# Patient Record
Sex: FEMALE | Race: WHITE | Employment: FULL TIME | URBAN - METROPOLITAN AREA
[De-identification: names, ages, dates, MRNs, and addresses within clinical notes are randomized per-mention and may not be internally consistent; named-entity substitution may affect disease eponyms.]

---

## 2017-04-25 ENCOUNTER — HOSPITAL ENCOUNTER (OUTPATIENT)
Dept: PHYSICAL THERAPY | Age: 52
Discharge: HOME | End: 2017-04-25
Payer: COMMERCIAL

## 2017-04-25 PROCEDURE — 97110 THERAPEUTIC EXERCISES: CPT

## 2017-04-25 PROCEDURE — 97162 PT EVAL MOD COMPLEX 30 MIN: CPT

## 2017-04-25 NOTE — PROGRESS NOTES
PHYSICAL THERAPY - DAILY TREATMENT NOTE    Patient Name: Negar Bess        Date: 2017  : 1965   YES Patient  Verified  Visit #:     Insurance: Payor: Hong Driscoll / Plan: [address] [place] [address] Rd PT / Product Type: Commerical /      In time: 4 Out time: 5   Total Treatment Time: 60     Medicare Time Tracking (below)   Total Timed Codes (min):  - 1:1 Treatment Time:  -     TREATMENT AREA =  L shoulder    SUBJECTIVE    Pain Level (on 0 to 10 scale):  4  / 10   Medication Changes/New allergies or changes in medical history, any new surgeries or procedures? NO    If yes, update Summary List   Subjective Functional Status/Changes:  []  No changes reported     See eval          OBJECTIVE    Modality rationale:  decrease pain/edema    min [] Estim, type:                                          []  att     []  unatt     []  w/US     []  w/ice    []  w/heat    min []  Mechanical Traction: type/lbs                                               []  pro   []  sup   []  int   []  cont    min []  Ultrasound, settings/location:      min []  Iontophoresis:  []  take home patch w/ dexamethazone    min                                []  in clinic w/ dexamethazone   10 min [x]  Ice     []  Heat     position:     min []  Other:      10 min Therapeutic Exercise:  [x]  See flow sheet   []  Other:      []  Added:     to improve (function):    []  Changed:     to improve (function):       min Therapeutic Activity:      5 min Manual Therapy:       min Gait Training:       min Patient Education:  YES  Reviewed HEP   []  Progressed/Changed HEP based on:         Other Objective/Functional Measures:    See eval     Post Treatment Pain Level (on 0 to 10) scale:   3  / 10     ASSESSMENT    []  See Progress Note/Recertification   Patient will continue to benefit from skilled therapy to address remaining functional deficits: see eval   Progress toward goals / Updated goals:    -     PLAN    [x]  Upgrade activities as tolerated YES Continue plan of care   []  Discharge due to :    []  Other:      Therapist: Sandeep Acosta PT    Date: 4/25/2017 Time: 4:59 PM

## 2017-04-25 NOTE — PROGRESS NOTES
[facility] PHYSICAL THERAPY AT [facility] [address] [facility] [address] - Phone: [phone]  Fax: [phone] / [address] [place] [address]  Patient Name: Giovany Stout : 1965   Medical   Diagnosis: Left shoulder pain [M25.512] Treatment Diagnosis: L shoulder impingement   Onset Date: Chronic     Referral Source: Kim Sampson MD Start of Care [facility]): 2017   Prior Hospitalization: See medical history Provider #: [id number]   Prior Level of Function: Chronic L shoulder pain    Comorbidities: Depression, OA,  Scoliosis    Medications: Verified on Patient Summary List   The Plan of Care and following information is based on the information from the initial evaluation.   ==============================================================================  Assessment / mcadams information:   Giovany Stout is a 54 y.o. female with a chief c/o constant chronic L shoulder pain, up to 10/10. The patient reports chronic L supraspinatus tear (diagnosed with MRI, 2 years ago). She now reports constant pain, particularly with overhead activities and horizontal abduction. She has full L shoulder elevation, but limited with ER AROM/PROM (40/60 degrees) and extension (40/58 degrees). She has + impingement sings and pain with resisted L shoulder abd and Empty can. MMT for ER/IR are normal.  SHe also c/o intermittent pain down her L arm to her hand and intermittent L hand sensation changes. Mechanical exam found a preference for C/S extension, with C/ S flexion causing L scapular pain and no provocation of symptoms with C/S extension. FOTO score = 57.   The patient would benefit from skilled physical therapy at this time.  ===========================================================================================  Eval Complexity: History MEDIUM  Complexity : 1-2 comorbidities / personal factors will impact the outcome/ POC ;  Examination  HIGH Complexity : 4+ Standardized tests and measures addressing body structure, function, activity limitation and / or participation in recreation ; Presentation MEDIUM Complexity : Evolving with changing characteristics ; Decision Making MEDIUM Complexity : FOTO score of 26-74; Overall Complexity MEDIUM  Problem List: pain affecting function, decrease ROM, decrease strength, edema affecting function, decrease ADL/ functional abilitiies, decrease activity tolerance and decrease flexibility/ joint mobility   Treatment Plan may include any combination of the following: Therapeutic exercise, Therapeutic activities, Neuromuscular re-education, Physical agent/modality, Manual therapy and Patient education  Patient / Family readiness to learn indicated by: asking questions, trying to perform skills and interest  Persons(s) to be included in education: patient (P)  Barriers to Learning/Limitations: None  Measures taken:    Patient Goal (s): \"learn treatment plan\"   Patient self reported health status: excellent  Rehabilitation Potential: good   Short Term Goals: To be accomplished in  2  weeks:  1. Pt compliant with HEP and self-care   Long Term Goals: To be accomplished in  4-5  weeks:  1. All ADL's without L shoulder pain > 2/10  2. Increase FOTO score to > 70, to indicate increased function  3. Pain free L shoulder abd and horizontal abd  4. Independent with HEP/selfcare   Frequency / Duration:   Patient to be seen 2-3  times per week for 4-8  weeks:  Patient / Caregiver education and instruction: self care, activity modification and exercises  Therapist Signature: Chante Guadarrama PT, MDT Date: 0/91/4048   Certification Period: NA Time: 5:00 PM   ===========================================================================================  I certify that the above Physical Therapy Services are being furnished while the patient is under my care.   I agree with the treatment plan and certify that this therapy is necessary. Physician Signature:        Date:       Time:     Please sign and return to [facility] [place] or you may fax the signed copy to [phone]. Thank you.

## 2017-04-27 ENCOUNTER — HOSPITAL ENCOUNTER (OUTPATIENT)
Dept: PHYSICAL THERAPY | Age: 52
Discharge: HOME | End: 2017-04-27
Payer: COMMERCIAL

## 2017-04-27 PROCEDURE — 97140 MANUAL THERAPY 1/> REGIONS: CPT

## 2017-04-27 PROCEDURE — 97110 THERAPEUTIC EXERCISES: CPT

## 2017-04-27 NOTE — PROGRESS NOTES
PHYSICAL THERAPY - DAILY TREATMENT NOTE    Patient Name: Mayelin Gastelum        Date: 2017  : 1965   YES Patient  Verified  Visit #:     Insurance: Payor: Soy Yo / Plan: [address] [place] [address] Rd PT / Product Type: Commerical /      In time: 0 Out time: 05   Total Treatment Time: 50     Medicare Time Tracking (below)   Total Timed Codes (min):  - 1:1 Treatment Time:  -     TREATMENT AREA =  L shoulder    SUBJECTIVE    Pain Level (on 5 to 10 scale): 5  / 10   Medication Changes/New allergies or changes in medical history, any new surgeries or procedures? NO    If yes, update Summary List   Subjective Functional Status/Changes:  []  No changes reported   Pt states that she normally just \"goes\" until she feels a lot of pain. She states that she does not use her L arm much and was feeling sore after her last visit. OBJECTIVE    Modality rationale:  decrease pain/edema    min [] Estim, type:                                          []  att     []  unatt     []  w/US     []  w/ice    []  w/heat    min []  Mechanical Traction: type/lbs                                               []  pro   []  sup   []  int   []  cont    min []  Ultrasound, settings/location:      min []  Iontophoresis:  []  take home patch w/ dexamethazone    min                                []  in clinic w/ dexamethazone   10 min [x]  Ice     []  Heat     position: Supine to L shoulder    min []  Other:      30 min Therapeutic Exercise:  [x]  See flow sheet   []  Other:      []  Added:     to improve (function):    []  Changed:     to improve (function):       min Therapeutic Activity:      8 min Manual Therapy: L UE mobility and scap/ACJ/SCJ mobs to reduce pain and improve functional mobility so pt can utilize L UE more. min Gait Training:       min Patient Education:  YES  Reviewed HEP   []  Progressed/Changed HEP based on:         Other Objective/Functional Measures:  Increased pain with horizontal add > end range flexion. ER/IR AROM WNL. Advised pt to use ice at end of day if L shoulder is painful/sore.      Post Treatment Pain Level (on 0 to 10) scale:   7/ 10     ASSESSMENT     []  See Progress Note/Recertification   Patient will continue to benefit from skilled therapy to address remaining functional deficits: see eval   Progress toward goals / Updated goals:    -     PLAN    [x]  Upgrade activities as tolerated YES Continue plan of care   []  Discharge due to :    []  Other:      Therapist: Garrison Batch    Date: 4/27/2017 Time: 4:59 PM

## 2017-05-02 ENCOUNTER — APPOINTMENT (OUTPATIENT)
Dept: PHYSICAL THERAPY | Age: 52
End: 2017-05-02
Payer: COMMERCIAL

## 2017-05-04 ENCOUNTER — HOSPITAL ENCOUNTER (OUTPATIENT)
Dept: PHYSICAL THERAPY | Age: 52
Discharge: HOME | End: 2017-05-04
Payer: COMMERCIAL

## 2017-05-04 PROCEDURE — 97110 THERAPEUTIC EXERCISES: CPT

## 2017-05-04 PROCEDURE — 97140 MANUAL THERAPY 1/> REGIONS: CPT

## 2017-05-04 NOTE — PROGRESS NOTES
PHYSICAL THERAPY - DAILY TREATMENT NOTE    Patient Name: Trevor Lopez        Date: 2017  : 1965   YES Patient  Verified  Visit #:   3of   12  Insurance: Payor: Miki Rogers / Plan: [address] PT / Product Type: Commerical /      In time: 505 Out time: 600   Total Treatment Time: 55     Medicare Time Tracking (below)   Total Timed Codes (min):  - 1:1 Treatment Time:  -     TREATMENT AREA =  L shoulder    SUBJECTIVE    Pain Level (on 5 to 10 scale): 3 / 10   Medication Changes/New allergies or changes in medical history, any new surgeries or procedures? NO    If yes, update Summary List   Subjective Functional Status/Changes:  []  No changes reported   Pt took a SCUBA class this weekend and is feeling sore, dull ache in upper back, shoulder and down back of arm on L side. OBJECTIVE    Modality rationale:  decrease pain/edema    min [] Estim, type:                                          []  att     []  unatt     []  w/US     []  w/ice    []  w/heat    min []  Mechanical Traction: type/lbs                                               []  pro   []  sup   []  int   []  cont    min []  Ultrasound, settings/location:      min []  Iontophoresis:  []  take home patch w/ dexamethazone    min                                []  in clinic w/ dexamethazone   20 min [x]  Ice     [x]  Heat     position: Supine L shoulder heat to start to loosen up tight ms in UT and medial scap. Ice s/p there ex.    min []  Other:      25 min Therapeutic Exercise:  [x]  See flow sheet   []  Other:      []  Added:     to improve (function):    []  Changed:     to improve (function):       min Therapeutic Activity:      10 min Manual Therapy: To increase L UE mobility for increased use during functional activities. min Gait Training:       min Patient Education:  YES  Reviewed HEP   []  Progressed/Changed HEP based on: Other Objective/Functional Measures:  ROM WNL.  Painful tightness noted in UT and medial scap on L. Post Treatment Pain Level (on 0 to 10) scale:  7/ 10     ASSESSMENT     []  See Progress Note/Recertification   Patient will continue to benefit from skilled therapy to address remaining functional deficits: see eval   Progress toward goals / Updated goals:    Reduced pain at end range flexion, abd and ER, but pt still has pain with functional use of  L UE. Painful sensation in L shoulder after about 10 active scap exercise. Advised pt to use ice to reduce pain/soreness.      PLAN    [x]  Upgrade activities as tolerated YES Continue plan of care   []  Discharge due to :    []  Other:      Therapist: Dante Arroyo    Date: 5/4/2017 Time: 4:59 PM

## 2017-05-11 ENCOUNTER — APPOINTMENT (OUTPATIENT)
Dept: PHYSICAL THERAPY | Age: 52
End: 2017-05-11
Payer: COMMERCIAL

## 2017-08-23 NOTE — PROGRESS NOTES
[facility] AT [facility] [address]  Phone: [phone]  Fax: [phone] SUMMARY  Patient Name: Lilia Medina : 1965   Treatment/Medical Diagnosis: Left shoulder pain [M25.512]   Referral Source: Manisha Zhong MD     Date of Initial Visit: 17 Attended Visits: 3 Missed Visits: 2     SUMMARY OF TREATMENT  Manual therapy, including massage, joint mobs and PROM. UE strengthening with focus on rotator cuff. Patient education on posture and HEP. Ice. CURRENT STATUS  The patient only attended 3 visits before failing to return to PT, and has been discharged from therapy. She did learn a few HEP exercises. RECOMMENDATIONS  Discontinue therapy due to lack of attendance or compliance. If you have any questions/comments please contact us directly at [phone]. Thank you for allowing us to assist in the care of your patient. Therapist Signature:  Leroy Sanchez PT Date: 17     Time: 1:44 PM